# Patient Record
Sex: FEMALE
[De-identification: names, ages, dates, MRNs, and addresses within clinical notes are randomized per-mention and may not be internally consistent; named-entity substitution may affect disease eponyms.]

---

## 2017-05-01 ENCOUNTER — RX ONLY (OUTPATIENT)
Age: 42
Setting detail: RX ONLY
End: 2017-05-01

## 2017-12-15 ENCOUNTER — OFFICE VISIT (OUTPATIENT)
Dept: MEDICAL GROUP | Facility: MEDICAL CENTER | Age: 42
End: 2017-12-15
Payer: COMMERCIAL

## 2017-12-15 VITALS
WEIGHT: 149.69 LBS | SYSTOLIC BLOOD PRESSURE: 102 MMHG | DIASTOLIC BLOOD PRESSURE: 70 MMHG | HEART RATE: 74 BPM | OXYGEN SATURATION: 98 % | BODY MASS INDEX: 22.69 KG/M2 | RESPIRATION RATE: 18 BRPM | HEIGHT: 68 IN | TEMPERATURE: 98.9 F

## 2017-12-15 DIAGNOSIS — R00.2 PALPITATIONS: ICD-10-CM

## 2017-12-15 DIAGNOSIS — Z13.6 SCREENING FOR ISCHEMIC HEART DISEASE: ICD-10-CM

## 2017-12-15 DIAGNOSIS — R42 DIZZINESS: ICD-10-CM

## 2017-12-15 DIAGNOSIS — Z00.00 HEALTHCARE MAINTENANCE: ICD-10-CM

## 2017-12-15 DIAGNOSIS — Z13.1 SCREENING FOR DIABETES MELLITUS: ICD-10-CM

## 2017-12-15 DIAGNOSIS — F41.9 ANXIETY: ICD-10-CM

## 2017-12-15 PROCEDURE — 99203 OFFICE O/P NEW LOW 30 MIN: CPT | Performed by: FAMILY MEDICINE

## 2017-12-15 RX ORDER — BUPROPION HYDROCHLORIDE 150 MG/1
150 TABLET, EXTENDED RELEASE ORAL 2 TIMES DAILY
COMMUNITY
End: 2018-01-23 | Stop reason: SDUPTHER

## 2017-12-15 ASSESSMENT — PATIENT HEALTH QUESTIONNAIRE - PHQ9: CLINICAL INTERPRETATION OF PHQ2 SCORE: 0

## 2017-12-15 NOTE — ASSESSMENT & PLAN NOTE
The patient describes occasional dizziness that occurs at night. She states that sometimes when she rises from her bed too fast she has a transient episode of lightheadedness. She also states that sometimes when she turns over in bed, she has some transient dizziness. She denies CP or SOB. She does endorse some palpitations at night, however.

## 2017-12-15 NOTE — LETTER
[facility]  Isaac Khan M.D.  [address]  [place] [address]  Fax: [phone]   Authorization for Release/Disclosure of   Protected Health Information   Name: ADRIAN ARENAS : 1975 SSN: [id number]   Address: [address] [place] [address]  [place] [address] Phone:    [phone] (home)    I authorize the entity listed below to release/disclose the PHI below to:   [facility]/Isaac Khan M.D. and Isaac Khan M.D.   Provider or Entity Name:     Address   City, State, Zip   Phone:      Fax:     Reason for request: continuity of care   Information to be released:    [  ] LAST COLONOSCOPY,  including any PATH REPORT and follow-up  [  ] LAST FIT/COLOGUARD RESULT [  ] LAST DEXA  [  ] LAST MAMMOGRAM  [  ] LAST PAP  [  ] LAST LABS [  ] RETINA EXAM REPORT  [  ] IMMUNIZATION RECORDS  [  ] Release all info      [  ] Check here and initial the line next to each item to release ALL health information INCLUDING  _____ Care and treatment for drug and / or alcohol abuse  _____ HIV testing, infection status, or AIDS  _____ Genetic Testing    DATES OF SERVICE OR TIME PERIOD TO BE DISCLOSED: _____________  I understand and acknowledge that:  * This Authorization may be revoked at any time by you in writing, except if your health information has already been used or disclosed.  * Your health information that will be used or disclosed as a result of you signing this authorization could be re-disclosed by the recipient. If this occurs, your re-disclosed health information may no longer be protected by State or Federal laws.  * You may refuse to sign this Authorization. Your refusal will not affect your ability to obtain treatment.  * This Authorization becomes effective upon signing and will  on (date) __________.      If no date is indicated, this Authorization will  one (1) year from the signature date.    Name: Adrian Arenas    Signature:   Date:     12/15/2017       PLEASE FAX  REQUESTED RECORDS BACK TO: [phone]

## 2017-12-15 NOTE — PROGRESS NOTES
[facility]  Progress Note  New Patient    Subjective:   Raquel Fowler is a 42 y.o. female here today with a chief complaint of anxiety. This is a new patient to me. The patient comes in alone.     Healthcare maintenance  Lipids: Ordered.  Fasting Glucose: Ordered.  PHQ2: done 12/15/17 and normal.   Mammogram: Done by her gynecologist.  Pap: Done by her gynecologist.    Flu vaccine: declines.   Tdap: declines.    Anxiety  The patient has anxiety. She was previously treated for this with Paxil but had terrible dreams and so was switched to Wellbutrin. She has been doing pretty well on this therapy, although she wanted to confirm that the dosing was appropriate with me today.    Dizziness  The patient describes occasional dizziness that occurs at night. She states that sometimes when she rises from her bed too fast she has a transient episode of lightheadedness. She also states that sometimes when she turns over in bed, she has some transient dizziness. She denies CP or SOB. She does endorse some palpitations at night, however.    Palpitations  The patient states that occasionally she'll wake up at night and feel palpitations. She denies associated chest pain or shortness of breath. She does not have them during the day.      No current outpatient prescriptions on file prior to visit.     No current facility-administered medications on file prior to visit.        Past Medical History:   Diagnosis Date   • Anxiety        Allergies: Patient has no known allergies.    Surgical History:  has no past surgical history on file.    Family History: family history includes Alcohol/Drug in her father; Cancer in her father; Heart Disease in her father; Hypertension in her father and mother; Other in her mother.    Social History:  reports that she has never smoked. She has never used smokeless tobacco. She reports that she drinks alcohol. She reports that she does not use drugs.    ROS:   See HPI.        Objective:  "    Vitals:    12/15/17 1126   BP: 102/70   Pulse: 74   Resp: 18   Temp: 37.2 °C (98.9 °F)   SpO2: 98%   Weight: 67.9 kg (149 lb 11.1 oz)   Height: 1.727 m (5' 8\")       Physical Exam:  Constitutional: Alert, no distress.  Skin: Warm, dry, good turgor, no rashes in visible areas.  ENMT: Lips without lesions, oropharynx clear.  Neck: No cervical or supraclavicular lymphadenopathy  Respiratory: Unlabored respiratory effort, lungs clear to auscultation, no wheezes, no ronchi.  Cardiovascular: Normal S1, S2, no murmur, no edema.  Abdomen: Soft, non-tender, no masses, no hepatosplenomegaly.  Psych: normal affect and mood.   Neuro: Lillian-Hallpike negative. Cranial nerves II through XII intact. Finger-to-nose normal. Gait normal.        Assessment and Plan:     1. Anxiety  I informed the patient that Wellbutrin is not indicated for anxiety, however, she seems to be doing well on this therapy. I discussed alternatives including trying a different SSRI or starting her on an SNRI in place of the Wellbutrin. She would like to continue the Wellbutrin.  - continue Wellbutrin  mg BID. Informed her this is an appropriate dose.    2. Healthcare maintenance  - See HPI.     3. Screening for ischemic heart disease  - LIPID PROFILE; Future    4. Screening for diabetes mellitus  - BASIC METABOLIC PANEL; Future    5. Dizziness  The patient describes 2 components to the dizziness, one consistent with orthostasis and the other consistent with vertigo. Her neurologic exam is normal today. Anxiety could also play a role.  - Recommend rising slowly from a seated or lying position.  - HOLTER - Cardiology Performed (48HR); Future, we'll perform because of coexistent palpitations.  - Return if not improved.    6. Palpitations  Patient describes health as night. Exam normal today.  - Holter.     Followup: Return in about 1 year (around 12/15/2018), or if symptoms worsen or fail to improve, for Wellness Visit, Long.         "

## 2017-12-15 NOTE — ASSESSMENT & PLAN NOTE
The patient states that occasionally she'll wake up at night and feel palpitations. She denies associated chest pain or shortness of breath. She does not have them during the day.

## 2017-12-15 NOTE — ASSESSMENT & PLAN NOTE
Lipids: Ordered.  Fasting Glucose: Ordered.  PHQ2: done 12/15/17 and normal.   Mammogram: Done by her gynecologist.  Pap: Done by her gynecologist.    Flu vaccine: declines.   Tdap: declines.

## 2017-12-15 NOTE — ASSESSMENT & PLAN NOTE
The patient has anxiety. She was previously treated for this with Paxil but had terrible dreams and so was switched to Wellbutrin. She has been doing pretty well on this therapy, although she wanted to confirm that the dosing was appropriate with me today.

## 2017-12-18 ENCOUNTER — HOSPITAL ENCOUNTER (OUTPATIENT)
Dept: LAB | Facility: MEDICAL CENTER | Age: 42
End: 2017-12-18
Attending: FAMILY MEDICINE
Payer: COMMERCIAL

## 2017-12-18 DIAGNOSIS — Z13.6 SCREENING FOR ISCHEMIC HEART DISEASE: ICD-10-CM

## 2017-12-18 DIAGNOSIS — Z13.1 SCREENING FOR DIABETES MELLITUS: ICD-10-CM

## 2017-12-18 PROCEDURE — 80048 BASIC METABOLIC PNL TOTAL CA: CPT

## 2017-12-18 PROCEDURE — 36415 COLL VENOUS BLD VENIPUNCTURE: CPT

## 2017-12-18 PROCEDURE — 80061 LIPID PANEL: CPT

## 2017-12-19 ENCOUNTER — TELEPHONE (OUTPATIENT)
Dept: MEDICAL GROUP | Facility: MEDICAL CENTER | Age: 42
End: 2017-12-19

## 2017-12-19 LAB
ANION GAP SERPL CALC-SCNC: 5 MMOL/L (ref 0–11.9)
BUN SERPL-MCNC: 10 MG/DL (ref 8–22)
CALCIUM SERPL-MCNC: 9 MG/DL (ref 8.5–10.5)
CHLORIDE SERPL-SCNC: 106 MMOL/L (ref 96–112)
CHOLEST SERPL-MCNC: 179 MG/DL (ref 100–199)
CO2 SERPL-SCNC: 27 MMOL/L (ref 20–33)
CREAT SERPL-MCNC: 0.68 MG/DL (ref 0.5–1.4)
GFR SERPL CREATININE-BSD FRML MDRD: >60 ML/MIN/1.73 M 2
GLUCOSE SERPL-MCNC: 83 MG/DL (ref 65–99)
HDLC SERPL-MCNC: 66 MG/DL
LDLC SERPL CALC-MCNC: 103 MG/DL
POTASSIUM SERPL-SCNC: 3.7 MMOL/L (ref 3.6–5.5)
SODIUM SERPL-SCNC: 138 MMOL/L (ref 135–145)
TRIGL SERPL-MCNC: 49 MG/DL (ref 0–149)

## 2017-12-19 NOTE — TELEPHONE ENCOUNTER
----- Message from Isaac Khan M.D. sent at 12/19/2017  9:30 AM PST -----  Please call and inform this patient of the following:  Her LDL cholesterol (bad cholesterol) is only slightly elevated at 103. Other than that, everything looks good. She should continue her work on diet and exercise.

## 2017-12-19 NOTE — TELEPHONE ENCOUNTER
Phone Number Called: [phone] (home)     Message: Called and left msg to return call.    Left Message for patient to call back: yes

## 2017-12-20 NOTE — TELEPHONE ENCOUNTER
Phone Number Called: [phone] (home)     Message: Called and left msg to return call.    Left Message for patient to call back: yes

## 2018-01-23 RX ORDER — BUPROPION HYDROCHLORIDE 150 MG/1
150 TABLET, EXTENDED RELEASE ORAL 2 TIMES DAILY
Qty: 180 TAB | Refills: 3 | Status: SHIPPED | OUTPATIENT
Start: 2018-01-23 | End: 2019-02-11 | Stop reason: SDUPTHER

## 2018-08-31 ENCOUNTER — HOSPITAL ENCOUNTER (OUTPATIENT)
Dept: RADIOLOGY | Facility: MEDICAL CENTER | Age: 43
End: 2018-08-31
Attending: OBSTETRICS & GYNECOLOGY
Payer: COMMERCIAL

## 2018-08-31 DIAGNOSIS — Z12.39 SCREENING BREAST EXAMINATION: ICD-10-CM

## 2018-08-31 PROCEDURE — 77067 SCR MAMMO BI INCL CAD: CPT

## 2018-09-05 ENCOUNTER — HOSPITAL ENCOUNTER (OUTPATIENT)
Dept: RADIOLOGY | Facility: MEDICAL CENTER | Age: 43
End: 2018-09-05

## 2018-11-12 ENCOUNTER — HOSPITAL ENCOUNTER (OUTPATIENT)
Dept: RADIOLOGY | Facility: MEDICAL CENTER | Age: 43
End: 2018-11-12
Attending: OBSTETRICS & GYNECOLOGY
Payer: COMMERCIAL

## 2018-11-12 DIAGNOSIS — R92.30 DENSE BREAST TISSUE: ICD-10-CM

## 2018-11-12 PROCEDURE — 4410555 US-SCREENING WHOLE BREAST (3D SCREENING)

## 2019-02-11 ENCOUNTER — OFFICE VISIT (OUTPATIENT)
Dept: MEDICAL GROUP | Facility: MEDICAL CENTER | Age: 44
End: 2019-02-11

## 2019-02-11 VITALS
HEIGHT: 69 IN | RESPIRATION RATE: 16 BRPM | WEIGHT: 156 LBS | SYSTOLIC BLOOD PRESSURE: 104 MMHG | DIASTOLIC BLOOD PRESSURE: 70 MMHG | BODY MASS INDEX: 23.11 KG/M2 | TEMPERATURE: 97.8 F | OXYGEN SATURATION: 98 % | HEART RATE: 67 BPM

## 2019-02-11 DIAGNOSIS — R42 DIZZINESS: ICD-10-CM

## 2019-02-11 DIAGNOSIS — L30.9 ECZEMA, UNSPECIFIED TYPE: ICD-10-CM

## 2019-02-11 DIAGNOSIS — F41.9 ANXIETY: ICD-10-CM

## 2019-02-11 DIAGNOSIS — R00.2 PALPITATIONS: ICD-10-CM

## 2019-02-11 PROCEDURE — 99213 OFFICE O/P EST LOW 20 MIN: CPT | Performed by: FAMILY MEDICINE

## 2019-02-11 RX ORDER — BUPROPION HYDROCHLORIDE 150 MG/1
150 TABLET, EXTENDED RELEASE ORAL 2 TIMES DAILY
Qty: 180 TAB | Refills: 4 | Status: SHIPPED | OUTPATIENT
Start: 2019-02-11

## 2019-02-11 RX ORDER — TRIAMCINOLONE ACETONIDE 1 MG/G
OINTMENT TOPICAL
Qty: 60 G | Refills: 1 | Status: SHIPPED
Start: 2019-02-11

## 2019-02-11 ASSESSMENT — PATIENT HEALTH QUESTIONNAIRE - PHQ9: CLINICAL INTERPRETATION OF PHQ2 SCORE: 0

## 2019-02-11 NOTE — PROGRESS NOTES
"[facility]  Progress Note  Established Patient    Subjective:   Raquel Fowler is a 43 y.o. female here today with a chief complaint of anxiety. The patient is alone.     Anxiety  Patient has anxiety well controlled with bupropion.  At one point, she tried reducing her dose but noticed anxiety coming back.  She would like to continue her current dose of bupropion.    Dizziness  This issue has resolved.    Eczema  Patient has a history of eczema that responds well to triamcinolone.  Sometimes she will get it on her hands.  She is requesting some ointment.    Palpitations  This issue has resolved.      No current outpatient prescriptions on file prior to visit.     No current facility-administered medications on file prior to visit.        Past Medical History:   Diagnosis Date   • Anxiety        Allergies: Patient has no known allergies.    Surgical History:  has no past surgical history on file.    Family History: family history includes Alcohol/Drug in her father; Breast Cancer in her maternal aunt; Cancer in her father; Heart Disease in her father; Hypertension in her father and mother; Other in her mother.    Social History:  reports that she has never smoked. She has never used smokeless tobacco. She reports that she drinks alcohol. She reports that she does not use drugs.    ROS: no fever.        Objective:     Vitals:    02/11/19 1103   BP: 104/70   BP Location: Left arm   Patient Position: Sitting   BP Cuff Size: Adult   Pulse: 67   Resp: 16   Temp: 36.6 °C (97.8 °F)   TempSrc: Temporal   SpO2: 98%   Weight: 70.8 kg (156 lb)   Height: 1.74 m (5' 8.5\")       Physical Exam:  General: alert in no apparent distress.   Cardio: regular rate and rhythm, no murmurs, rubs or gallops.   Resp: CTAB no w/r/r.         Assessment and Plan:     1. Anxiety  - buPROPion SR (WELLBUTRIN-SR) 150 MG TABLET SR 12 HR sustained-release tablet; Take 1 Tab by mouth 2 times a day.  Dispense: 180 Tab; Refill: 4    2. Eczema, " unspecified type  - triamcinolone acetonide (KENALOG) 0.1 % Ointment; Apply a thin layer to affected area BID PRN rash  Dispense: 60 g; Refill: 1    3. Palpitations  - resolved.     4. Dizziness  - resolved.         Followup: Return in about 1 year (around 2/11/2020), or if symptoms worsen or fail to improve, for Wellness Visit, Long.

## 2019-02-11 NOTE — ASSESSMENT & PLAN NOTE
Patient has anxiety well controlled with bupropion.  At one point, she tried reducing her dose but noticed anxiety coming back.  She would like to continue her current dose of bupropion.

## 2019-02-11 NOTE — ASSESSMENT & PLAN NOTE
Patient has a history of eczema that responds well to triamcinolone.  Sometimes she will get it on her hands.  She is requesting some ointment.

## 2025-04-21 ENCOUNTER — APPOINTMENT (OUTPATIENT)
Dept: URBAN - METROPOLITAN AREA CLINIC 35 | Facility: CLINIC | Age: 50
Setting detail: DERMATOLOGY
End: 2025-04-21

## 2025-04-21 DIAGNOSIS — L98.8 OTHER SPECIFIED DISORDERS OF THE SKIN AND SUBCUTANEOUS TISSUE: ICD-10-CM

## 2025-04-21 DIAGNOSIS — Z71.89 OTHER SPECIFIED COUNSELING: ICD-10-CM

## 2025-04-21 DIAGNOSIS — L82.1 OTHER SEBORRHEIC KERATOSIS: ICD-10-CM

## 2025-04-21 DIAGNOSIS — L81.5 LEUKODERMA, NOT ELSEWHERE CLASSIFIED: ICD-10-CM

## 2025-04-21 DIAGNOSIS — B00.1 HERPESVIRAL VESICULAR DERMATITIS: ICD-10-CM

## 2025-04-21 DIAGNOSIS — L81.4 OTHER MELANIN HYPERPIGMENTATION: ICD-10-CM

## 2025-04-21 DIAGNOSIS — D18.0 HEMANGIOMA: ICD-10-CM

## 2025-04-21 DIAGNOSIS — D22 MELANOCYTIC NEVI: ICD-10-CM

## 2025-04-21 DIAGNOSIS — L20.89 OTHER ATOPIC DERMATITIS: ICD-10-CM | Status: INADEQUATELY CONTROLLED

## 2025-04-21 DIAGNOSIS — Z41.9 ENCOUNTER FOR PROCEDURE FOR PURPOSES OTHER THAN REMEDYING HEALTH STATE, UNSPECIFIED: ICD-10-CM

## 2025-04-21 PROBLEM — D18.01 HEMANGIOMA OF SKIN AND SUBCUTANEOUS TISSUE: Status: ACTIVE | Noted: 2025-04-21

## 2025-04-21 PROBLEM — D22.71 MELANOCYTIC NEVI OF RIGHT LOWER LIMB, INCLUDING HIP: Status: ACTIVE | Noted: 2025-04-21

## 2025-04-21 PROBLEM — D22.5 MELANOCYTIC NEVI OF TRUNK: Status: ACTIVE | Noted: 2025-04-21

## 2025-04-21 PROBLEM — D23.39 OTHER BENIGN NEOPLASM OF SKIN OF OTHER PARTS OF FACE: Status: ACTIVE | Noted: 2025-04-21

## 2025-04-21 PROBLEM — D48.5 NEOPLASM OF UNCERTAIN BEHAVIOR OF SKIN: Status: ACTIVE | Noted: 2025-04-21

## 2025-04-21 PROBLEM — D22.72 MELANOCYTIC NEVI OF LEFT LOWER LIMB, INCLUDING HIP: Status: ACTIVE | Noted: 2025-04-21

## 2025-04-21 PROCEDURE — ? PRESCRIPTION

## 2025-04-21 PROCEDURE — ? BIOPSY BY SHAVE METHOD

## 2025-04-21 PROCEDURE — 11102 TANGNTL BX SKIN SINGLE LES: CPT

## 2025-04-21 PROCEDURE — ? TREATMENT REGIMEN

## 2025-04-21 PROCEDURE — ? COUNSELING

## 2025-04-21 PROCEDURE — 99204 OFFICE O/P NEW MOD 45 MIN: CPT | Mod: 25

## 2025-04-21 PROCEDURE — ? COSMETIC CONSULTATION: BOTOX

## 2025-04-21 RX ORDER — TACROLIMUS 1 MG/G
OINTMENT TOPICAL BID
Qty: 60 | Refills: 11 | Status: ERX | COMMUNITY
Start: 2025-04-21

## 2025-04-21 RX ORDER — VALACYCLOVIR 1 G/1
1 TABLET, FILM COATED ORAL EVERY 12 HOURS
Qty: 30 | Refills: 3 | Status: ERX | COMMUNITY
Start: 2025-04-21

## 2025-04-21 RX ORDER — CLOBETASOL PROPIONATE 0.5 MG/G
CREAM TOPICAL BID
Qty: 60 | Refills: 5 | Status: ERX | COMMUNITY
Start: 2025-04-21

## 2025-04-21 RX ADMIN — VALACYCLOVIR 1: 1 TABLET, FILM COATED ORAL at 00:00

## 2025-04-21 RX ADMIN — TACROLIMUS: 1 OINTMENT TOPICAL at 00:00

## 2025-04-21 RX ADMIN — CLOBETASOL PROPIONATE: 0.5 CREAM TOPICAL at 00:00

## 2025-04-21 ASSESSMENT — LOCATION DETAILED DESCRIPTION DERM
LOCATION DETAILED: LEFT PROXIMAL DORSAL FOREARM
LOCATION DETAILED: LEFT CENTRAL MALAR CHEEK
LOCATION DETAILED: LEFT ULNAR DORSAL HAND
LOCATION DETAILED: INFERIOR THORACIC SPINE
LOCATION DETAILED: RIGHT PROXIMAL DORSAL FOREARM
LOCATION DETAILED: RIGHT RADIAL DORSAL HAND
LOCATION DETAILED: RIGHT PROXIMAL POSTERIOR THIGH
LOCATION DETAILED: LEFT SUPERIOR VERMILION LIP
LOCATION DETAILED: LEFT INFERIOR VERMILION LIP
LOCATION DETAILED: LEFT DISTAL POSTERIOR THIGH
LOCATION DETAILED: LEFT SUPERIOR MEDIAL MIDBACK
LOCATION DETAILED: RIGHT INFERIOR MEDIAL MIDBACK
LOCATION DETAILED: RIGHT SUPERIOR VERMILION LIP
LOCATION DETAILED: SUPERIOR LUMBAR SPINE
LOCATION DETAILED: RIGHT INFERIOR VERMILION LIP

## 2025-04-21 ASSESSMENT — ITCH NUMERIC RATING SCALE: HOW SEVERE IS YOUR ITCHING?: 6

## 2025-04-21 ASSESSMENT — LOCATION SIMPLE DESCRIPTION DERM
LOCATION SIMPLE: RIGHT LOWER BACK
LOCATION SIMPLE: LOWER BACK
LOCATION SIMPLE: LEFT LIP
LOCATION SIMPLE: RIGHT HAND
LOCATION SIMPLE: LEFT POSTERIOR THIGH
LOCATION SIMPLE: RIGHT POSTERIOR THIGH
LOCATION SIMPLE: LEFT LOWER BACK
LOCATION SIMPLE: LEFT CHEEK
LOCATION SIMPLE: RIGHT FOREARM
LOCATION SIMPLE: UPPER BACK
LOCATION SIMPLE: LEFT FOREARM
LOCATION SIMPLE: RIGHT LIP
LOCATION SIMPLE: LEFT HAND

## 2025-04-21 ASSESSMENT — LOCATION ZONE DERM
LOCATION ZONE: FACE
LOCATION ZONE: LIP
LOCATION ZONE: ARM
LOCATION ZONE: HAND
LOCATION ZONE: TRUNK
LOCATION ZONE: LEG

## 2025-04-21 ASSESSMENT — SEVERITY ASSESSMENT 2020: SEVERITY 2020: MODERATE
